# Patient Record
Sex: FEMALE | Race: WHITE
[De-identification: names, ages, dates, MRNs, and addresses within clinical notes are randomized per-mention and may not be internally consistent; named-entity substitution may affect disease eponyms.]

---

## 2019-01-22 ENCOUNTER — HOSPITAL ENCOUNTER (OUTPATIENT)
Dept: HOSPITAL 74 - FASU | Age: 84
End: 2019-01-22
Payer: COMMERCIAL

## 2019-02-05 ENCOUNTER — HOSPITAL ENCOUNTER (OUTPATIENT)
Dept: HOSPITAL 74 - FASU | Age: 84
Discharge: HOME | End: 2019-02-05
Attending: OPHTHALMOLOGY
Payer: COMMERCIAL

## 2019-02-05 VITALS — BODY MASS INDEX: 21.8 KG/M2

## 2019-02-05 VITALS — TEMPERATURE: 98.1 F

## 2019-02-05 VITALS — DIASTOLIC BLOOD PRESSURE: 64 MMHG | HEART RATE: 60 BPM | SYSTOLIC BLOOD PRESSURE: 135 MMHG

## 2019-02-05 DIAGNOSIS — H25.812: Primary | ICD-10-CM

## 2019-02-05 PROCEDURE — 08RK3JZ REPLACEMENT OF LEFT LENS WITH SYNTHETIC SUBSTITUTE, PERCUTANEOUS APPROACH: ICD-10-PCS | Performed by: OPHTHALMOLOGY

## 2019-02-05 RX ADMIN — PHENYLEPHRINE HYDROCHLORIDE SCH DROP: 0.25 SPRAY NASAL at 10:50

## 2019-02-05 RX ADMIN — GENTAMICIN SULFATE SCH DROP: 3 SOLUTION/ DROPS OPHTHALMIC at 10:50

## 2019-02-05 RX ADMIN — GENTAMICIN SULFATE SCH DROP: 3 SOLUTION/ DROPS OPHTHALMIC at 10:40

## 2019-02-05 RX ADMIN — CYCLOPENTOLATE HYDROCHLORIDE SCH DROP: 10 SOLUTION/ DROPS OPHTHALMIC at 10:35

## 2019-02-05 RX ADMIN — PHENYLEPHRINE HYDROCHLORIDE SCH DROP: 0.25 SPRAY NASAL at 10:55

## 2019-02-05 RX ADMIN — TROPICAMIDE SCH DROP: 10 SOLUTION/ DROPS OPHTHALMIC at 10:40

## 2019-02-05 RX ADMIN — KETOROLAC TROMETHAMINE SCH DROP: 5 SOLUTION OPHTHALMIC at 10:40

## 2019-02-05 RX ADMIN — CYCLOPENTOLATE HYDROCHLORIDE SCH DROP: 10 SOLUTION/ DROPS OPHTHALMIC at 10:45

## 2019-02-05 RX ADMIN — GENTAMICIN SULFATE SCH DROP: 3 SOLUTION/ DROPS OPHTHALMIC at 10:45

## 2019-02-05 RX ADMIN — KETOROLAC TROMETHAMINE SCH DROP: 5 SOLUTION OPHTHALMIC at 10:50

## 2019-02-05 RX ADMIN — CYCLOPENTOLATE HYDROCHLORIDE SCH DROP: 10 SOLUTION/ DROPS OPHTHALMIC at 10:55

## 2019-02-05 RX ADMIN — CYCLOPENTOLATE HYDROCHLORIDE SCH DROP: 10 SOLUTION/ DROPS OPHTHALMIC at 10:40

## 2019-02-05 RX ADMIN — KETOROLAC TROMETHAMINE SCH DROP: 5 SOLUTION OPHTHALMIC at 10:35

## 2019-02-05 RX ADMIN — TROPICAMIDE SCH DROP: 10 SOLUTION/ DROPS OPHTHALMIC at 10:50

## 2019-02-05 RX ADMIN — CYCLOPENTOLATE HYDROCHLORIDE SCH DROP: 10 SOLUTION/ DROPS OPHTHALMIC at 10:50

## 2019-02-05 RX ADMIN — PHENYLEPHRINE HYDROCHLORIDE SCH DROP: 0.25 SPRAY NASAL at 10:35

## 2019-02-05 RX ADMIN — GENTAMICIN SULFATE SCH DROP: 3 SOLUTION/ DROPS OPHTHALMIC at 10:55

## 2019-02-05 RX ADMIN — TROPICAMIDE SCH DROP: 10 SOLUTION/ DROPS OPHTHALMIC at 10:45

## 2019-02-05 RX ADMIN — KETOROLAC TROMETHAMINE SCH DROP: 5 SOLUTION OPHTHALMIC at 10:45

## 2019-02-05 RX ADMIN — PHENYLEPHRINE HYDROCHLORIDE SCH DROP: 0.25 SPRAY NASAL at 10:45

## 2019-02-05 RX ADMIN — PHENYLEPHRINE HYDROCHLORIDE SCH DROP: 0.25 SPRAY NASAL at 10:40

## 2019-02-05 RX ADMIN — KETOROLAC TROMETHAMINE SCH DROP: 5 SOLUTION OPHTHALMIC at 10:55

## 2019-02-05 RX ADMIN — GENTAMICIN SULFATE SCH DROP: 3 SOLUTION/ DROPS OPHTHALMIC at 10:35

## 2019-02-05 RX ADMIN — TROPICAMIDE SCH DROP: 10 SOLUTION/ DROPS OPHTHALMIC at 10:55

## 2019-02-05 RX ADMIN — TROPICAMIDE SCH DROP: 10 SOLUTION/ DROPS OPHTHALMIC at 10:35

## 2019-02-05 NOTE — OP
DATE OF OPERATION:  02/05/2019

 

PREOPERATIVE DIAGNOSIS:  Cataract, left eye. 

 

POSTOPERATIVE DIAGNOSIS:  Cataract, left eye, hypermature.  

 

PROCEDURE:  Cataract extraction via phacoemulsification with insertion of posterior

chamber lens implant, left eye. 

 

SURGEON:  Diony Alvarez MD

 

ASSISTANT:  Linda Carter MD

 

ANESTHESIA:  Topical with sedation.

 

ESTIMATED BLOOD LOSS:  Less than 1 mL.  

 

COMPLICATIONS:  None.

 

SPECIMENS:  None.

 

DESCRIPTION OF PROCEDURE:  The patient was identified in the holding area.  After all

risks, benefits, and alternatives were explained to the patient, informed consent was

obtained.  The left eye was marked with a marking pen.  The patient then entered the

operating room on an eye stretcher.  After a formal time-out was performed, topical

tetracaine eye drops were instilled onto the left eye.  The left eye was then prepped

and draped in the usual sterile fashion.  An eyelid speculum was placed beneath the

eyelids of the left eye.  An inferotemporal paracentesis incision was created using a

15-degree blade.  Topical preservative-free epinephrine and preservative-free

lidocaine were then injected into the anterior chamber.  Viscoelastic was then

injected into the anterior chamber.  A 2.4-mm keratome blade was then used to make a

superotemporal incision.  A 360-degree continuous curvilinear capsulorrhexis was then

created using bent cystotome and Utrata forceps.  Hydrodissection was performed using

balanced saline solution on a cannula.  Phacoemulsification was introduced to

disassemble and remove the nucleus in its entirety.  Of note, the nucleus was

hypermature.  Then, irrigation/aspiration was then used to remove any remaining

cortical material from the eye.  Viscoelastic was then used to reform the capsular

bag.  An Raz model [id number] with a power of 24.0 diopters, serial number [id number],

was inspected and found to be defect-free and injected into the capsular bag. 

Irrigation/aspiration was then used to remove any remaining viscoelastic from the

eye.  The anterior chamber was reformed using balanced saline solution . 

Intracameral injection of Miochol and Miostat were then administered, and the pupil

came down and was round.  All wounds were hydrated with balanced saline solution,

noted to be watertight.  The anterior chamber was deep.  The eye had a red reflex. 

The had an adequate pressure, and the lens was perfectly centered on the capsular

bag.  Topical antibiotic eye drops and ointment were then administered to the left

eye.  The eyelid speculum was removed from the left eye.  The left eye was shielded. 

The patient tolerated the procedure well and left the operating room in stable

condition to follow up in the eye clinic tomorrow morning at 10:00.  

 

 

DIONY ALVAREZ M.D.

 

[id number]

DD: 02/05/2019 12:50

DT: 02/05/2019 13:35

Job #:  [id number]

## 2020-09-09 PROBLEM — Z00.00 ENCOUNTER FOR PREVENTIVE HEALTH EXAMINATION: Status: ACTIVE | Noted: 2020-09-09

## 2020-09-11 ENCOUNTER — APPOINTMENT (OUTPATIENT)
Dept: ENDOCRINOLOGY | Facility: CLINIC | Age: 85
End: 2020-09-11
Payer: MEDICARE

## 2020-09-11 VITALS
DIASTOLIC BLOOD PRESSURE: 60 MMHG | BODY MASS INDEX: 24.54 KG/M2 | HEIGHT: 60 IN | WEIGHT: 125 LBS | HEART RATE: 68 BPM | OXYGEN SATURATION: 98 % | SYSTOLIC BLOOD PRESSURE: 150 MMHG

## 2020-09-11 DIAGNOSIS — Z86.39 PERSONAL HISTORY OF OTHER ENDOCRINE, NUTRITIONAL AND METABOLIC DISEASE: ICD-10-CM

## 2020-09-11 DIAGNOSIS — Z78.9 OTHER SPECIFIED HEALTH STATUS: ICD-10-CM

## 2020-09-11 DIAGNOSIS — Z83.3 FAMILY HISTORY OF DIABETES MELLITUS: ICD-10-CM

## 2020-09-11 LAB — GLUCOSE BLDC GLUCOMTR-MCNC: 171

## 2020-09-11 PROCEDURE — 82962 GLUCOSE BLOOD TEST: CPT

## 2020-09-11 PROCEDURE — 99204 OFFICE O/P NEW MOD 45 MIN: CPT | Mod: 25

## 2020-09-11 RX ORDER — LISINOPRIL 5 MG/1
5 TABLET ORAL DAILY
Refills: 0 | Status: ACTIVE | COMMUNITY

## 2020-09-11 RX ORDER — ATORVASTATIN CALCIUM 80 MG/1
80 TABLET, FILM COATED ORAL
Refills: 0 | Status: ACTIVE | COMMUNITY

## 2020-09-11 RX ORDER — APIXABAN 2.5 MG/1
2.5 TABLET, FILM COATED ORAL
Refills: 0 | Status: ACTIVE | COMMUNITY

## 2020-09-14 NOTE — HISTORY OF PRESENT ILLNESS
[FreeTextEntry1] : Ms. KHUSHBOO HERNANDEZ is 85 year female .\par KHUSHBOO  was diagnosed of diabetes type 2- 40    years back. Currently she is on Levemir 8 untis and novlog 6 with meals\par  wants to switch her medication regimen \par does not like these medication- feels tired on these, \par h/o ASCVD \par checks BG at home- 3times a day  - no logs to review today \par hypoglycemia - none \par no recent labs to review \par no hyperglycemic symptoms but c/o rash on her back which is itchy \par h/o high blood pressure -yes\par h/o high cholesterol - likley\par Diet - regular \par Exercise -walks\par Educator visit -never\par \par \par \par

## 2020-09-18 ENCOUNTER — LABORATORY RESULT (OUTPATIENT)
Age: 85
End: 2020-09-18

## 2020-09-23 LAB
ALBUMIN SERPL ELPH-MCNC: 4.4 G/DL
ALP BLD-CCNC: 88 U/L
ALT SERPL-CCNC: 12 U/L
ANION GAP SERPL CALC-SCNC: 13 MMOL/L
AST SERPL-CCNC: 18 U/L
BILIRUB SERPL-MCNC: 0.3 MG/DL
BUN SERPL-MCNC: 23 MG/DL
CALCIUM SERPL-MCNC: 9.8 MG/DL
CHLORIDE SERPL-SCNC: 104 MMOL/L
CHOLEST SERPL-MCNC: 235 MG/DL
CHOLEST/HDLC SERPL: 3.3 RATIO
CO2 SERPL-SCNC: 27 MMOL/L
CREAT SERPL-MCNC: 0.99 MG/DL
CREAT SPEC-SCNC: 40 MG/DL
ESTIMATED AVERAGE GLUCOSE: 177 MG/DL
GLUCOSE SERPL-MCNC: 141 MG/DL
HBA1C MFR BLD HPLC: 7.8 %
HDLC SERPL-MCNC: 71 MG/DL
LDLC SERPL CALC-MCNC: 146 MG/DL
MICROALBUMIN 24H UR DL<=1MG/L-MCNC: 1.9 MG/DL
MICROALBUMIN/CREAT 24H UR-RTO: 48 MG/G
POTASSIUM SERPL-SCNC: 5.6 MMOL/L
PROT SERPL-MCNC: 7.3 G/DL
SODIUM SERPL-SCNC: 144 MMOL/L
TRIGL SERPL-MCNC: 90 MG/DL

## 2020-09-25 ENCOUNTER — APPOINTMENT (OUTPATIENT)
Dept: ENDOCRINOLOGY | Facility: CLINIC | Age: 85
End: 2020-09-25
Payer: MEDICARE

## 2020-09-25 VITALS
HEIGHT: 60 IN | SYSTOLIC BLOOD PRESSURE: 190 MMHG | DIASTOLIC BLOOD PRESSURE: 80 MMHG | OXYGEN SATURATION: 95 % | WEIGHT: 127 LBS | BODY MASS INDEX: 24.94 KG/M2 | HEART RATE: 64 BPM

## 2020-09-25 DIAGNOSIS — Z79.4 TYPE 2 DIABETES MELLITUS W/OUT COMPLICATIONS: ICD-10-CM

## 2020-09-25 DIAGNOSIS — E11.9 TYPE 2 DIABETES MELLITUS W/OUT COMPLICATIONS: ICD-10-CM

## 2020-09-25 LAB — GLUCOSE BLDC GLUCOMTR-MCNC: 180

## 2020-09-25 PROCEDURE — 99214 OFFICE O/P EST MOD 30 MIN: CPT | Mod: 25

## 2020-09-25 PROCEDURE — 95251 CONT GLUC MNTR ANALYSIS I&R: CPT | Mod: 59

## 2020-09-25 PROCEDURE — 95250 CONT GLUC MNTR PHYS/QHP EQP: CPT | Mod: 59

## 2020-09-25 RX ORDER — INSULIN DETEMIR 100 [IU]/ML
100 INJECTION, SOLUTION SUBCUTANEOUS
Refills: 0 | Status: DISCONTINUED | COMMUNITY
End: 2020-09-25

## 2020-09-25 RX ORDER — INSULIN ASPART 100 [IU]/ML
100 INJECTION, SOLUTION INTRAVENOUS; SUBCUTANEOUS
Refills: 0 | Status: DISCONTINUED | COMMUNITY
End: 2020-09-25

## 2020-09-25 NOTE — ASSESSMENT
[Hypoglycemia Management] : hypoglycemia management [Diabetic Medications] : Risks and benefits of diabetic medications were discussed

## 2020-09-25 NOTE — PROCEDURE
[FreeTextEntry1] : CGM data worth  15       days reviewed \par Percent time worn 100 % \par dated from Sep 11 to Sep 25 \par s/o \par in target range-  67 ( target > 70 % ) \par Coefficient of variation   -    43.7         ( < 33 %) \par GMI 6.3 % \par severe hypoglycemia - 5 % \par reviewed and scanned in the system \par Adv \par discussed with the patient\par

## 2020-09-25 NOTE — HISTORY OF PRESENT ILLNESS
[FreeTextEntry1] : Ms. KHUSHBOO HERNANDEZ is 85 year female .\par KHUSHBOO  was diagnosed of diabetes type 2- 40    years back. Currently she is on Levemir 8 untis and novlog 6 with meals\par  wants to switch her medication regimen \par does not like these medication- feels tired on these, \par h/o ASCVD \par checks BG at home- 3times a day  - no logs to review today \par hypoglycemia - none \par no recent labs to review \par no hyperglycemic symptoms but c/o rash on her back which is itchy \par h/o high blood pressure -yes\par h/o high cholesterol - likley\par Diet - regular \par Exercise -walks\par Educator visit -never\par \par \par \par 09/25/2020- FOLLOW UP\par  KHUSHBOO HERNANDEZ is here to discuss test results , labs and further plan of care \par labs discussed in detail-  essentially unremarkable \par no acute medical issues in the interim\par wants to stop insulin \par i discussed hypoglycemia risk with insulin \par Review of system \par no chest pain, no palpitations \par no Shortness of breath,no wheezing. \par \par \par \par

## 2020-12-30 ENCOUNTER — TRANSCRIPTION ENCOUNTER (OUTPATIENT)
Age: 85
End: 2020-12-30

## 2021-05-19 ENCOUNTER — RX RENEWAL (OUTPATIENT)
Age: 86
End: 2021-05-19

## 2021-06-01 ENCOUNTER — RX RENEWAL (OUTPATIENT)
Age: 86
End: 2021-06-01

## 2021-11-03 ENCOUNTER — RX RENEWAL (OUTPATIENT)
Age: 86
End: 2021-11-03

## 2021-11-30 ENCOUNTER — RX RENEWAL (OUTPATIENT)
Age: 86
End: 2021-11-30

## 2022-03-01 ENCOUNTER — RX RENEWAL (OUTPATIENT)
Age: 87
End: 2022-03-01

## 2022-03-29 ENCOUNTER — RX RENEWAL (OUTPATIENT)
Age: 87
End: 2022-03-29

## 2022-03-29 RX ORDER — METFORMIN HYDROCHLORIDE 1000 MG/1
1000 TABLET, COATED ORAL TWICE DAILY
Qty: 180 | Refills: 0 | Status: ACTIVE | COMMUNITY
Start: 2020-09-25 | End: 1900-01-01

## 2022-12-29 PROBLEM — Z00.00 ENCOUNTER FOR PREVENTIVE HEALTH EXAMINATION: Noted: 2022-12-29

## 2023-03-09 ENCOUNTER — APPOINTMENT (OUTPATIENT)
Dept: ENDOCRINOLOGY | Facility: CLINIC | Age: 88
End: 2023-03-09

## 2023-08-31 ENCOUNTER — TRANSCRIPTION ENCOUNTER (OUTPATIENT)
Age: 88
End: 2023-08-31

## 2023-09-05 ENCOUNTER — APPOINTMENT (OUTPATIENT)
Dept: CARDIOLOGY | Facility: CLINIC | Age: 88
End: 2023-09-05
Payer: MEDICARE

## 2023-09-05 ENCOUNTER — NON-APPOINTMENT (OUTPATIENT)
Age: 88
End: 2023-09-05

## 2023-09-05 VITALS
RESPIRATION RATE: 14 BRPM | DIASTOLIC BLOOD PRESSURE: 66 MMHG | SYSTOLIC BLOOD PRESSURE: 100 MMHG | WEIGHT: 122 LBS | HEIGHT: 60 IN | BODY MASS INDEX: 23.95 KG/M2 | OXYGEN SATURATION: 98 % | HEART RATE: 88 BPM

## 2023-09-05 DIAGNOSIS — I10 ESSENTIAL (PRIMARY) HYPERTENSION: ICD-10-CM

## 2023-09-05 DIAGNOSIS — Z86.39 PERSONAL HISTORY OF OTHER ENDOCRINE, NUTRITIONAL AND METABOLIC DISEASE: ICD-10-CM

## 2023-09-05 DIAGNOSIS — E78.5 HYPERLIPIDEMIA, UNSPECIFIED: ICD-10-CM

## 2023-09-05 DIAGNOSIS — R21 RASH AND OTHER NONSPECIFIC SKIN ERUPTION: ICD-10-CM

## 2023-09-05 DIAGNOSIS — I48.91 UNSPECIFIED ATRIAL FIBRILLATION: ICD-10-CM

## 2023-09-05 DIAGNOSIS — I25.10 ATHEROSCLEROTIC HEART DISEASE OF NATIVE CORONARY ARTERY W/OUT ANGINA PECTORIS: ICD-10-CM

## 2023-09-05 PROCEDURE — 99214 OFFICE O/P EST MOD 30 MIN: CPT | Mod: 25

## 2023-09-05 PROCEDURE — 93000 ELECTROCARDIOGRAM COMPLETE: CPT

## 2023-09-05 RX ORDER — LIFITEGRAST 50 MG/ML
5 SOLUTION/ DROPS OPHTHALMIC
Refills: 0 | Status: ACTIVE | COMMUNITY

## 2023-09-05 RX ORDER — INSULIN GLARGINE 100 [IU]/ML
100 INJECTION, SOLUTION SUBCUTANEOUS
Refills: 0 | Status: ACTIVE | COMMUNITY

## 2023-09-05 RX ORDER — AMLODIPINE BESYLATE 5 MG/1
5 TABLET ORAL DAILY
Refills: 0 | Status: ACTIVE | COMMUNITY

## 2023-09-05 RX ORDER — LEVOTHYROXINE SODIUM 0.12 MG/1
125 TABLET ORAL DAILY
Refills: 0 | Status: ACTIVE | COMMUNITY

## 2023-09-05 NOTE — HISTORY OF PRESENT ILLNESS
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 During hospitalization, she was noted to be in atrial fibrillation and started on metoprolol succinate 50 mg po daily upon discharge. However, son current reports that he did not have the chance to  metoprolol from pharmacy over the long weekend, but will  the medication today. Patient currently denies chest pain, dyspnea, palpitations, syncope, edema, melena, hematochezia, or hematemesis.

## 2023-09-05 NOTE — PHYSICAL EXAM
[Well Developed] : well developed [Well Nourished] : well nourished [No Acute Distress] : no acute distress [Normal Conjunctiva] : normal conjunctiva [Normal Venous Pressure] : normal venous pressure [No Carotid Bruit] : no carotid bruit [Tachycardia] : tachycardic [Irregularly Irregular] : irregularly irregular [Normal S1] : normal S1 [Normal S2] : normal S2 [No Murmur] : no murmurs heard [No Pitting Edema] : no pitting edema present [Clear Lung Fields] : clear lung fields [Good Air Entry] : good air entry [No Respiratory Distress] : no respiratory distress  [No Edema] : no edema [No Cyanosis] : no cyanosis [No Clubbing] : no clubbing [Normal Speech] : normal speech [Alert and Oriented] : alert and oriented [S3] : no S3 [S4] : no S4

## 2023-09-05 NOTE — ASSESSMENT
[FreeTextEntry1] : 87 yo female with atrial fibrillation (on Eliquis), hypothyroidism, hyperlipidemia, DM type 2, prior CVA, MI, CAD -> CABG ~ 6 years ago (in [facility] ECG today demonstrated atrial fibrillation with rapid ventricular rate of 140 bpm, RBBB, possible old inferior infarct, possible old anterior infarct.  Patient to  Rx for metoprolol succinate 50 mg po daily for afib rate control and return for rate check in 1-2 weeks. Patient declined inpatient hospitalization for acute rate control. Will continue Eliquis 2.5 mg po bid. Will order echocardiogram to assess LV function and structural heart disease.  Patient is clinically stable from CAD standpoint. Will continue statin, metoprolol succinate, and lisinopril.  BP is currently controlled. Will continue metoprolol succinate and lisinopril.  Will continue atorvastatin 80 mg po daily for hyperlipidemia management.

## 2023-09-05 NOTE — CARDIOLOGY SUMMARY
[de-identified] : 9/5/23 ECG: Atrial fibrillation with rapid ventricular rate of 140 bpm, RBBB, possible old inferior infarct, possible old anterior infarct [de-identified] : 7/11/18 Echo (at [place]): TDS. Normal LV size and LVEF, 50-55%. Septal hypokinesis. Grade II diastolic dysfunction. Mildly increased LA volume index (34 ml/m2). MAC. Mild MR. Mild AS (MAIA 1.5 cm2, mean gradient 3.1 mmHg). Mild to mod TR. PASP 47 mmHg. Trivial pericardial effusion. Pleural effusion.

## 2023-09-17 ENCOUNTER — TRANSCRIPTION ENCOUNTER (OUTPATIENT)
Age: 88
End: 2023-09-17